# Patient Record
Sex: MALE | Race: WHITE | NOT HISPANIC OR LATINO | URBAN - NONMETROPOLITAN AREA
[De-identification: names, ages, dates, MRNs, and addresses within clinical notes are randomized per-mention and may not be internally consistent; named-entity substitution may affect disease eponyms.]

---

## 2020-07-07 NOTE — PATIENT DISCUSSION
GLAUCOMA:  I have talked with the patient about my impressions, explained our treatment plan, and have answered all questions and patient understands. Continue present eye drops but will add Carteolol OD BID. It may help with some dryness too. Patient to follow up in about 4 months.  OCT OU

## 2021-10-29 ENCOUNTER — IMPORTED ENCOUNTER (OUTPATIENT)
Dept: URBAN - NONMETROPOLITAN AREA CLINIC 1 | Facility: CLINIC | Age: 46
End: 2021-10-29

## 2021-10-29 PROBLEM — H10.31: Noted: 2021-10-29

## 2021-10-29 PROCEDURE — 99204 OFFICE O/P NEW MOD 45 MIN: CPT

## 2021-10-29 NOTE — PATIENT DISCUSSION
Inflammatory Conjunctivitis OD-No FB's noted or corneal staining-Start TOBRADEX QID OD-Start cold compresses-RTC 1 week

## 2022-04-09 ASSESSMENT — VISUAL ACUITY
OD_CC: 20/20
OS_CC: 20/20

## 2023-01-24 NOTE — PATIENT DISCUSSION
Sending in Renato to Martina in [facility]. Pt is to use 1 gtt 1 x a day OS only for 3 weeks. Pt will then RTC in 3 weeks for IOP Eval. If gtt is working @ 3 week follow-up pt will continue gtt 1 x a day OU.